# Patient Record
Sex: MALE | Race: WHITE
[De-identification: names, ages, dates, MRNs, and addresses within clinical notes are randomized per-mention and may not be internally consistent; named-entity substitution may affect disease eponyms.]

---

## 2018-06-20 ENCOUNTER — HOSPITAL ENCOUNTER (INPATIENT)
Dept: HOSPITAL 96 - M.ERS | Age: 53
LOS: 6 days | Discharge: HOME | DRG: 291 | End: 2018-06-26
Attending: INTERNAL MEDICINE | Admitting: INTERNAL MEDICINE
Payer: COMMERCIAL

## 2018-06-20 VITALS — SYSTOLIC BLOOD PRESSURE: 216 MMHG | DIASTOLIC BLOOD PRESSURE: 148 MMHG

## 2018-06-20 VITALS — SYSTOLIC BLOOD PRESSURE: 198 MMHG | DIASTOLIC BLOOD PRESSURE: 148 MMHG

## 2018-06-20 VITALS — DIASTOLIC BLOOD PRESSURE: 147 MMHG | SYSTOLIC BLOOD PRESSURE: 210 MMHG

## 2018-06-20 VITALS — SYSTOLIC BLOOD PRESSURE: 156 MMHG | DIASTOLIC BLOOD PRESSURE: 92 MMHG

## 2018-06-20 VITALS — DIASTOLIC BLOOD PRESSURE: 120 MMHG | SYSTOLIC BLOOD PRESSURE: 197 MMHG

## 2018-06-20 VITALS — DIASTOLIC BLOOD PRESSURE: 125 MMHG | SYSTOLIC BLOOD PRESSURE: 189 MMHG

## 2018-06-20 VITALS — SYSTOLIC BLOOD PRESSURE: 170 MMHG | DIASTOLIC BLOOD PRESSURE: 104 MMHG

## 2018-06-20 VITALS — WEIGHT: 272 LBS | BODY MASS INDEX: 33.82 KG/M2 | HEIGHT: 75 IN

## 2018-06-20 VITALS — SYSTOLIC BLOOD PRESSURE: 193 MMHG | DIASTOLIC BLOOD PRESSURE: 127 MMHG

## 2018-06-20 DIAGNOSIS — I16.0: ICD-10-CM

## 2018-06-20 DIAGNOSIS — E78.5: ICD-10-CM

## 2018-06-20 DIAGNOSIS — N25.81: ICD-10-CM

## 2018-06-20 DIAGNOSIS — J96.01: ICD-10-CM

## 2018-06-20 DIAGNOSIS — N18.4: ICD-10-CM

## 2018-06-20 DIAGNOSIS — E66.9: ICD-10-CM

## 2018-06-20 DIAGNOSIS — I50.43: ICD-10-CM

## 2018-06-20 DIAGNOSIS — I13.0: Primary | ICD-10-CM

## 2018-06-20 DIAGNOSIS — J45.909: ICD-10-CM

## 2018-06-20 DIAGNOSIS — N17.9: ICD-10-CM

## 2018-06-20 DIAGNOSIS — J98.4: ICD-10-CM

## 2018-06-20 DIAGNOSIS — J44.9: ICD-10-CM

## 2018-06-20 DIAGNOSIS — Z82.49: ICD-10-CM

## 2018-06-20 DIAGNOSIS — E44.0: ICD-10-CM

## 2018-06-20 DIAGNOSIS — J18.9: ICD-10-CM

## 2018-06-20 DIAGNOSIS — Z79.899: ICD-10-CM

## 2018-06-20 DIAGNOSIS — Z91.14: ICD-10-CM

## 2018-06-20 LAB
ABSOLUTE BASOPHILS: 0.1 THOU/UL (ref 0–0.2)
ABSOLUTE EOSINOPHILS: 0.2 THOU/UL (ref 0–0.7)
ABSOLUTE MONOCYTES: 0.7 THOU/UL (ref 0–1.2)
ALBUMIN SERPL-MCNC: 3.1 G/DL (ref 3.4–5)
ALP SERPL-CCNC: 45 U/L (ref 46–116)
ALT SERPL-CCNC: 25 U/L (ref 30–65)
ANION GAP SERPL CALC-SCNC: 9 MMOL/L (ref 7–16)
APTT BLD: 27.1 SECONDS (ref 25–31.3)
AST SERPL-CCNC: 24 U/L (ref 15–37)
BASOPHILS NFR BLD AUTO: 1 %
BILIRUB SERPL-MCNC: 0.5 MG/DL
BUN SERPL-MCNC: 33 MG/DL (ref 7–18)
CALCIUM SERPL-MCNC: 9 MG/DL (ref 8.5–10.1)
CHLORIDE SERPL-SCNC: 103 MMOL/L (ref 98–107)
CO2 SERPL-SCNC: 28 MMOL/L (ref 21–32)
CREAT SERPL-MCNC: 3.4 MG/DL (ref 0.6–1.3)
EOSINOPHIL NFR BLD: 2.3 %
GLUCOSE SERPL-MCNC: 116 MG/DL (ref 70–99)
GRANULOCYTES NFR BLD MANUAL: 77.4 %
HCT VFR BLD CALC: 35.4 % (ref 42–52)
HGB BLD-MCNC: 12.1 GM/DL (ref 14–18)
INR PPP: 1
LIPASE: 419 U/L (ref 73–393)
LYMPHOCYTES # BLD: 1.2 THOU/UL (ref 0.8–5.3)
LYMPHOCYTES NFR BLD AUTO: 12.1 %
MAGNESIUM SERPL-MCNC: 2.1 MG/DL (ref 1.8–2.4)
MCH RBC QN AUTO: 28 PG (ref 26–34)
MCHC RBC AUTO-ENTMCNC: 34.1 G/DL (ref 28–37)
MCV RBC: 82 FL (ref 80–100)
MONOCYTES NFR BLD: 7.2 %
MPV: 7.9 FL. (ref 7.2–11.1)
NEUTROPHILS # BLD: 7.7 THOU/UL (ref 1.6–8.1)
NUCLEATED RBCS: 0 /100WBC
PLATELET COUNT*: 311 THOU/UL (ref 150–400)
POTASSIUM SERPL-SCNC: 3.5 MMOL/L (ref 3.5–5.1)
PROT SERPL-MCNC: 6.8 G/DL (ref 6.4–8.2)
PROTHROMBIN TIME: 10 SECONDS (ref 9.2–11.5)
RBC # BLD AUTO: 4.32 MIL/UL (ref 4.5–6)
RDW-CV: 12.9 % (ref 10.5–14.5)
SODIUM SERPL-SCNC: 140 MMOL/L (ref 136–145)
TROPONIN-I LEVEL: 0.09 NG/ML (ref ?–0.06)
WBC # BLD AUTO: 9.9 THOU/UL (ref 4–11)

## 2018-06-21 VITALS — SYSTOLIC BLOOD PRESSURE: 179 MMHG | DIASTOLIC BLOOD PRESSURE: 104 MMHG

## 2018-06-21 VITALS — DIASTOLIC BLOOD PRESSURE: 106 MMHG | SYSTOLIC BLOOD PRESSURE: 165 MMHG

## 2018-06-21 VITALS — DIASTOLIC BLOOD PRESSURE: 110 MMHG | SYSTOLIC BLOOD PRESSURE: 174 MMHG

## 2018-06-21 VITALS — DIASTOLIC BLOOD PRESSURE: 100 MMHG | SYSTOLIC BLOOD PRESSURE: 153 MMHG

## 2018-06-21 VITALS — DIASTOLIC BLOOD PRESSURE: 83 MMHG | SYSTOLIC BLOOD PRESSURE: 131 MMHG

## 2018-06-21 VITALS — DIASTOLIC BLOOD PRESSURE: 104 MMHG | SYSTOLIC BLOOD PRESSURE: 155 MMHG

## 2018-06-21 LAB
ANION GAP SERPL CALC-SCNC: 11 MMOL/L (ref 7–16)
BILIRUB UR-MCNC: NEGATIVE MG/DL
BUN SERPL-MCNC: 41 MG/DL (ref 7–18)
CALCIUM SERPL-MCNC: 8.6 MG/DL (ref 8.5–10.1)
CHLORIDE SERPL-SCNC: 102 MMOL/L (ref 98–107)
CO2 SERPL-SCNC: 26 MMOL/L (ref 21–32)
COLOR UR: YELLOW
CREAT SERPL-MCNC: 3.4 MG/DL (ref 0.6–1.3)
GLUCOSE SERPL-MCNC: 133 MG/DL (ref 70–99)
KETONES UR STRIP-MCNC: NEGATIVE MG/DL
MUCUS: (no result) STRN/LPF
POTASSIUM SERPL-SCNC: 3.7 MMOL/L (ref 3.5–5.1)
PROT UR QL STRIP: (no result)
RBC # UR STRIP: NEGATIVE /UL
SODIUM SERPL-SCNC: 139 MMOL/L (ref 136–145)
SP GR UR STRIP: 1.02 (ref 1–1.03)
SQUAMOUS: (no result) /LPF (ref 0–3)
URINE CLARITY: CLEAR
URINE GLUCOSE-RANDOM: NEGATIVE
URINE LEUKOCYTES-REFLEX: NEGATIVE
URINE NITRITE-REFLEX: NEGATIVE
UROBILINOGEN UR STRIP-ACNC: 0.2 E.U./DL (ref 0.2–1)

## 2018-06-22 VITALS — SYSTOLIC BLOOD PRESSURE: 164 MMHG | DIASTOLIC BLOOD PRESSURE: 116 MMHG

## 2018-06-22 VITALS — SYSTOLIC BLOOD PRESSURE: 149 MMHG | DIASTOLIC BLOOD PRESSURE: 100 MMHG

## 2018-06-22 VITALS — SYSTOLIC BLOOD PRESSURE: 169 MMHG | DIASTOLIC BLOOD PRESSURE: 117 MMHG

## 2018-06-22 VITALS — DIASTOLIC BLOOD PRESSURE: 101 MMHG | SYSTOLIC BLOOD PRESSURE: 146 MMHG

## 2018-06-22 VITALS — SYSTOLIC BLOOD PRESSURE: 116 MMHG | DIASTOLIC BLOOD PRESSURE: 77 MMHG

## 2018-06-22 VITALS — DIASTOLIC BLOOD PRESSURE: 107 MMHG | SYSTOLIC BLOOD PRESSURE: 148 MMHG

## 2018-06-22 LAB
ALBUMIN SERPL-MCNC: 2.8 G/DL (ref 3.4–5)
ALP SERPL-CCNC: 41 U/L (ref 46–116)
ALT SERPL-CCNC: 22 U/L (ref 30–65)
ANION GAP SERPL CALC-SCNC: 10 MMOL/L (ref 7–16)
AST SERPL-CCNC: 18 U/L (ref 15–37)
BILIRUB SERPL-MCNC: 0.3 MG/DL
BUN SERPL-MCNC: 47 MG/DL (ref 7–18)
CALCIUM SERPL-MCNC: 8.2 MG/DL (ref 8.5–10.1)
CHLORIDE SERPL-SCNC: 102 MMOL/L (ref 98–107)
CO2 SERPL-SCNC: 25 MMOL/L (ref 21–32)
CREAT SERPL-MCNC: 3.4 MG/DL (ref 0.6–1.3)
EGFR IF AFRICAN AMERICAN: 26 (ref 59–?)
GLUCOSE SERPL-MCNC: 100 MG/DL (ref 70–99)
HCT VFR BLD CALC: 33.1 % (ref 42–52)
HGB BLD-MCNC: 11.1 GM/DL (ref 14–18)
MCH RBC QN AUTO: 28 PG (ref 26–34)
MCHC RBC AUTO-ENTMCNC: 33.7 G/DL (ref 28–37)
MCV RBC: 83 FL (ref 80–100)
MPV: 8.3 FL. (ref 7.2–11.1)
PHOSPHATE SERPL-MCNC: 5.5 MG/DL (ref 2.5–4.9)
PLATELET COUNT*: 293 THOU/UL (ref 150–400)
POTASSIUM SERPL-SCNC: 3.9 MMOL/L (ref 3.5–5.1)
PROT SERPL-MCNC: 6.3 G/DL (ref 6.4–8.2)
PTH-INTACT SERPL-MCNC: 134 PG/ML (ref 15–65)
RBC # BLD AUTO: 3.99 MIL/UL (ref 4.5–6)
RDW-CV: 13.5 % (ref 10.5–14.5)
SODIUM SERPL-SCNC: 137 MMOL/L (ref 136–145)
WBC # BLD AUTO: 11.6 THOU/UL (ref 4–11)

## 2018-06-23 VITALS — DIASTOLIC BLOOD PRESSURE: 110 MMHG | SYSTOLIC BLOOD PRESSURE: 146 MMHG

## 2018-06-23 VITALS — SYSTOLIC BLOOD PRESSURE: 155 MMHG | DIASTOLIC BLOOD PRESSURE: 97 MMHG

## 2018-06-23 VITALS — DIASTOLIC BLOOD PRESSURE: 87 MMHG | SYSTOLIC BLOOD PRESSURE: 132 MMHG

## 2018-06-23 VITALS — SYSTOLIC BLOOD PRESSURE: 171 MMHG | DIASTOLIC BLOOD PRESSURE: 113 MMHG

## 2018-06-23 VITALS — DIASTOLIC BLOOD PRESSURE: 89 MMHG | SYSTOLIC BLOOD PRESSURE: 150 MMHG

## 2018-06-23 LAB
ANION GAP SERPL CALC-SCNC: 7 MMOL/L (ref 7–16)
BUN SERPL-MCNC: 47 MG/DL (ref 7–18)
CALCIUM SERPL-MCNC: 8.5 MG/DL (ref 8.5–10.1)
CHLORIDE SERPL-SCNC: 102 MMOL/L (ref 98–107)
CO2 SERPL-SCNC: 27 MMOL/L (ref 21–32)
CREAT SERPL-MCNC: 3.3 MG/DL (ref 0.6–1.3)
GLUCOSE SERPL-MCNC: 101 MG/DL (ref 70–99)
POTASSIUM SERPL-SCNC: 3.7 MMOL/L (ref 3.5–5.1)
SODIUM SERPL-SCNC: 136 MMOL/L (ref 136–145)

## 2018-06-24 VITALS — DIASTOLIC BLOOD PRESSURE: 91 MMHG | SYSTOLIC BLOOD PRESSURE: 159 MMHG

## 2018-06-24 VITALS — SYSTOLIC BLOOD PRESSURE: 138 MMHG | DIASTOLIC BLOOD PRESSURE: 88 MMHG

## 2018-06-24 VITALS — SYSTOLIC BLOOD PRESSURE: 161 MMHG | DIASTOLIC BLOOD PRESSURE: 107 MMHG

## 2018-06-24 VITALS — SYSTOLIC BLOOD PRESSURE: 139 MMHG | DIASTOLIC BLOOD PRESSURE: 88 MMHG

## 2018-06-24 VITALS — DIASTOLIC BLOOD PRESSURE: 94 MMHG | SYSTOLIC BLOOD PRESSURE: 135 MMHG

## 2018-06-24 VITALS — DIASTOLIC BLOOD PRESSURE: 93 MMHG | SYSTOLIC BLOOD PRESSURE: 143 MMHG

## 2018-06-24 LAB
ANION GAP SERPL CALC-SCNC: 10 MMOL/L (ref 7–16)
BUN SERPL-MCNC: 43 MG/DL (ref 7–18)
CALCIUM SERPL-MCNC: 8.9 MG/DL (ref 8.5–10.1)
CHLORIDE SERPL-SCNC: 102 MMOL/L (ref 98–107)
CO2 SERPL-SCNC: 25 MMOL/L (ref 21–32)
CREAT SERPL-MCNC: 3.3 MG/DL (ref 0.6–1.3)
GLUCOSE SERPL-MCNC: 103 MG/DL (ref 70–99)
POTASSIUM SERPL-SCNC: 3.9 MMOL/L (ref 3.5–5.1)
SODIUM SERPL-SCNC: 137 MMOL/L (ref 136–145)

## 2018-06-25 VITALS — SYSTOLIC BLOOD PRESSURE: 153 MMHG | DIASTOLIC BLOOD PRESSURE: 98 MMHG

## 2018-06-25 VITALS — SYSTOLIC BLOOD PRESSURE: 142 MMHG | DIASTOLIC BLOOD PRESSURE: 94 MMHG

## 2018-06-25 VITALS — SYSTOLIC BLOOD PRESSURE: 151 MMHG | DIASTOLIC BLOOD PRESSURE: 100 MMHG

## 2018-06-25 VITALS — SYSTOLIC BLOOD PRESSURE: 158 MMHG | DIASTOLIC BLOOD PRESSURE: 106 MMHG

## 2018-06-25 LAB
ALBUMIN SERPL-MCNC: 2.9 G/DL (ref 3.4–5)
ALP SERPL-CCNC: 37 U/L (ref 46–116)
ALT SERPL-CCNC: 26 U/L (ref 30–65)
ANION GAP SERPL CALC-SCNC: 13 MMOL/L (ref 7–16)
AST SERPL-CCNC: 15 U/L (ref 15–37)
BILIRUB SERPL-MCNC: 0.4 MG/DL
BUN SERPL-MCNC: 46 MG/DL (ref 7–18)
CALCIUM SERPL-MCNC: 8.8 MG/DL (ref 8.5–10.1)
CHLORIDE SERPL-SCNC: 104 MMOL/L (ref 98–107)
CO2 SERPL-SCNC: 23 MMOL/L (ref 21–32)
CREAT SERPL-MCNC: 3.2 MG/DL (ref 0.6–1.3)
GLUCOSE SERPL-MCNC: 107 MG/DL (ref 70–99)
HCT VFR BLD CALC: 36.1 % (ref 42–52)
HGB BLD-MCNC: 11.9 GM/DL (ref 14–18)
MAGNESIUM SERPL-MCNC: 2.6 MG/DL (ref 1.8–2.4)
MCH RBC QN AUTO: 27.7 PG (ref 26–34)
MCHC RBC AUTO-ENTMCNC: 33.1 G/DL (ref 28–37)
MCV RBC: 83.7 FL (ref 80–100)
MPV: 8.6 FL. (ref 7.2–11.1)
PLATELET COUNT*: 294 THOU/UL (ref 150–400)
POTASSIUM SERPL-SCNC: 3.9 MMOL/L (ref 3.5–5.1)
PROT SERPL-MCNC: 6.3 G/DL (ref 6.4–8.2)
RBC # BLD AUTO: 4.31 MIL/UL (ref 4.5–6)
RDW-CV: 13.4 % (ref 10.5–14.5)
SODIUM SERPL-SCNC: 140 MMOL/L (ref 136–145)
WBC # BLD AUTO: 11.4 THOU/UL (ref 4–11)

## 2018-06-26 VITALS — SYSTOLIC BLOOD PRESSURE: 142 MMHG | DIASTOLIC BLOOD PRESSURE: 97 MMHG

## 2018-06-26 VITALS — DIASTOLIC BLOOD PRESSURE: 102 MMHG | SYSTOLIC BLOOD PRESSURE: 153 MMHG

## 2018-06-26 VITALS — DIASTOLIC BLOOD PRESSURE: 110 MMHG | SYSTOLIC BLOOD PRESSURE: 157 MMHG

## 2018-06-26 LAB
ANION GAP SERPL CALC-SCNC: 13 MMOL/L (ref 7–16)
BUN SERPL-MCNC: 53 MG/DL (ref 7–18)
CALCIUM SERPL-MCNC: 9.2 MG/DL (ref 8.5–10.1)
CHLORIDE SERPL-SCNC: 102 MMOL/L (ref 98–107)
CO2 SERPL-SCNC: 22 MMOL/L (ref 21–32)
CREAT SERPL-MCNC: 3.4 MG/DL (ref 0.6–1.3)
GLUCOSE SERPL-MCNC: 159 MG/DL (ref 70–99)
HCT VFR BLD CALC: 37.5 % (ref 42–52)
HGB BLD-MCNC: 12.5 GM/DL (ref 14–18)
MAGNESIUM SERPL-MCNC: 2.8 MG/DL (ref 1.8–2.4)
MCH RBC QN AUTO: 27.8 PG (ref 26–34)
MCHC RBC AUTO-ENTMCNC: 33.3 G/DL (ref 28–37)
MCV RBC: 83.5 FL (ref 80–100)
MPV: 8.8 FL. (ref 7.2–11.1)
PLATELET COUNT*: 352 THOU/UL (ref 150–400)
POTASSIUM SERPL-SCNC: 5.1 MMOL/L (ref 3.5–5.1)
RBC # BLD AUTO: 4.49 MIL/UL (ref 4.5–6)
RDW-CV: 13.3 % (ref 10.5–14.5)
SODIUM SERPL-SCNC: 137 MMOL/L (ref 136–145)
WBC # BLD AUTO: 15.7 THOU/UL (ref 4–11)

## 2018-07-06 ENCOUNTER — HOSPITAL ENCOUNTER (OUTPATIENT)
Dept: HOSPITAL 96 - M.LAB | Age: 53
End: 2018-07-06
Attending: NURSE PRACTITIONER
Payer: COMMERCIAL

## 2018-07-06 DIAGNOSIS — N18.3: ICD-10-CM

## 2018-07-06 DIAGNOSIS — I12.9: Primary | ICD-10-CM

## 2018-07-06 LAB
ANION GAP SERPL CALC-SCNC: 8 MMOL/L (ref 7–16)
BUN SERPL-MCNC: 52 MG/DL (ref 7–18)
CALCIUM SERPL-MCNC: 9.3 MG/DL (ref 8.5–10.1)
CHLORIDE SERPL-SCNC: 100 MMOL/L (ref 98–107)
CO2 SERPL-SCNC: 27 MMOL/L (ref 21–32)
CREAT SERPL-MCNC: 3 MG/DL (ref 0.6–1.3)
GLUCOSE SERPL-MCNC: 116 MG/DL (ref 70–99)
POTASSIUM SERPL-SCNC: 4.2 MMOL/L (ref 3.5–5.1)
SODIUM SERPL-SCNC: 135 MMOL/L (ref 136–145)

## 2018-10-08 ENCOUNTER — HOSPITAL ENCOUNTER (OUTPATIENT)
Dept: HOSPITAL 96 - M.LAB | Age: 53
End: 2018-10-08
Attending: INTERNAL MEDICINE
Payer: COMMERCIAL

## 2018-10-08 DIAGNOSIS — N18.4: Primary | ICD-10-CM

## 2018-10-08 LAB
ANION GAP SERPL CALC-SCNC: 8 MMOL/L (ref 7–16)
BUN SERPL-MCNC: 30 MG/DL (ref 7–18)
CALCIUM SERPL-MCNC: 9.6 MG/DL (ref 8.5–10.1)
CHLORIDE SERPL-SCNC: 105 MMOL/L (ref 98–107)
CO2 SERPL-SCNC: 27 MMOL/L (ref 21–32)
CREAT SERPL-MCNC: 2.6 MG/DL (ref 0.6–1.3)
GLUCOSE SERPL-MCNC: 70 MG/DL (ref 70–99)
POTASSIUM SERPL-SCNC: 4.2 MMOL/L (ref 3.5–5.1)
SODIUM SERPL-SCNC: 140 MMOL/L (ref 136–145)

## 2018-11-30 ENCOUNTER — HOSPITAL ENCOUNTER (OUTPATIENT)
Dept: HOSPITAL 96 - M.LAB | Age: 53
End: 2018-11-30
Attending: INTERNAL MEDICINE
Payer: COMMERCIAL

## 2018-11-30 DIAGNOSIS — I12.9: Primary | ICD-10-CM

## 2018-11-30 DIAGNOSIS — N18.4: ICD-10-CM

## 2018-11-30 LAB
ANION GAP SERPL CALC-SCNC: 12 MMOL/L (ref 7–16)
BUN SERPL-MCNC: 40 MG/DL (ref 7–18)
CALCIUM SERPL-MCNC: 8.9 MG/DL (ref 8.5–10.1)
CHLORIDE SERPL-SCNC: 103 MMOL/L (ref 98–107)
CO2 SERPL-SCNC: 25 MMOL/L (ref 21–32)
CREAT SERPL-MCNC: 2.9 MG/DL (ref 0.6–1.3)
GLUCOSE SERPL-MCNC: 92 MG/DL (ref 70–99)
POTASSIUM SERPL-SCNC: 3.6 MMOL/L (ref 3.5–5.1)
SODIUM SERPL-SCNC: 140 MMOL/L (ref 136–145)

## 2019-05-08 ENCOUNTER — HOSPITAL ENCOUNTER (OUTPATIENT)
Dept: HOSPITAL 96 - M.CRD | Age: 54
End: 2019-05-08
Attending: INTERNAL MEDICINE
Payer: COMMERCIAL

## 2019-05-08 DIAGNOSIS — I50.9: ICD-10-CM

## 2019-05-08 DIAGNOSIS — N18.4: ICD-10-CM

## 2019-05-08 DIAGNOSIS — I42.8: ICD-10-CM

## 2019-05-08 DIAGNOSIS — I34.0: Primary | ICD-10-CM

## 2019-05-08 DIAGNOSIS — I13.0: ICD-10-CM

## 2020-02-19 ENCOUNTER — HOSPITAL ENCOUNTER (OUTPATIENT)
Dept: HOSPITAL 96 - M.MRI | Age: 55
End: 2020-02-19
Attending: NURSE PRACTITIONER
Payer: COMMERCIAL

## 2020-02-19 DIAGNOSIS — M25.722: ICD-10-CM

## 2020-02-19 DIAGNOSIS — M77.12: Primary | ICD-10-CM

## 2020-02-19 DIAGNOSIS — M51.06: ICD-10-CM

## 2021-08-30 ENCOUNTER — HOSPITAL ENCOUNTER (INPATIENT)
Dept: HOSPITAL 96 - M.ERS | Age: 56
LOS: 7 days | Discharge: HOME | DRG: 177 | End: 2021-09-06
Attending: INTERNAL MEDICINE | Admitting: INTERNAL MEDICINE
Payer: COMMERCIAL

## 2021-08-30 VITALS — DIASTOLIC BLOOD PRESSURE: 101 MMHG | SYSTOLIC BLOOD PRESSURE: 145 MMHG

## 2021-08-30 VITALS — DIASTOLIC BLOOD PRESSURE: 78 MMHG | SYSTOLIC BLOOD PRESSURE: 118 MMHG

## 2021-08-30 VITALS — WEIGHT: 248 LBS | HEIGHT: 75 IN | BODY MASS INDEX: 30.84 KG/M2

## 2021-08-30 DIAGNOSIS — I13.0: ICD-10-CM

## 2021-08-30 DIAGNOSIS — N40.0: ICD-10-CM

## 2021-08-30 DIAGNOSIS — E83.41: ICD-10-CM

## 2021-08-30 DIAGNOSIS — U07.1: Primary | ICD-10-CM

## 2021-08-30 DIAGNOSIS — Z79.899: ICD-10-CM

## 2021-08-30 DIAGNOSIS — Z82.49: ICD-10-CM

## 2021-08-30 DIAGNOSIS — D69.6: ICD-10-CM

## 2021-08-30 DIAGNOSIS — J96.01: ICD-10-CM

## 2021-08-30 DIAGNOSIS — I42.8: ICD-10-CM

## 2021-08-30 DIAGNOSIS — N18.4: ICD-10-CM

## 2021-08-30 DIAGNOSIS — I48.0: ICD-10-CM

## 2021-08-30 DIAGNOSIS — E66.9: ICD-10-CM

## 2021-08-30 DIAGNOSIS — J12.82: ICD-10-CM

## 2021-08-30 DIAGNOSIS — E87.6: ICD-10-CM

## 2021-08-30 DIAGNOSIS — I50.9: ICD-10-CM

## 2021-08-30 DIAGNOSIS — E87.2: ICD-10-CM

## 2021-08-30 DIAGNOSIS — E87.1: ICD-10-CM

## 2021-08-30 DIAGNOSIS — N17.0: ICD-10-CM

## 2021-08-30 LAB
ABSOLUTE MONOCYTES: 0.1 THOU/UL (ref 0–1.2)
ALBUMIN SERPL-MCNC: 2.5 G/DL (ref 3.4–5)
ALP SERPL-CCNC: 53 U/L (ref 46–116)
ALT SERPL-CCNC: 74 U/L (ref 30–65)
ANION GAP SERPL CALC-SCNC: 21 MMOL/L (ref 7–16)
AST SERPL-CCNC: 77 U/L (ref 15–37)
BILIRUB SERPL-MCNC: 0.5 MG/DL
BUN SERPL-MCNC: 132 MG/DL (ref 7–18)
CALCIUM SERPL-MCNC: 8.4 MG/DL (ref 8.5–10.1)
CHLORIDE SERPL-SCNC: 94 MMOL/L (ref 98–107)
CO2 SERPL-SCNC: 16 MMOL/L (ref 21–32)
CREAT SERPL-MCNC: 6.5 MG/DL (ref 0.6–1.3)
GIANT PLATELETS BLD QL SMEAR: (no result)
GLUCOSE SERPL-MCNC: 182 MG/DL (ref 70–99)
GRANULOCYTES NFR BLD MANUAL: 96 %
HCT VFR BLD CALC: 43.9 % (ref 42–52)
HGB BLD-MCNC: 15 GM/DL (ref 14–18)
LG PLATELETS BLD QL SMEAR: (no result)
LYMPHOCYTES # BLD: 0.3 THOU/UL (ref 0.8–5.3)
LYMPHOCYTES NFR BLD AUTO: 3 %
MAGNESIUM SERPL-MCNC: 2.8 MG/DL (ref 1.8–2.4)
MCH RBC QN AUTO: 27.1 PG (ref 26–34)
MCHC RBC AUTO-ENTMCNC: 34.1 G/DL (ref 28–37)
MCV RBC: 79.5 FL (ref 80–100)
MONOCYTES NFR BLD: 1 %
MPV: 11.1 FL. (ref 7.2–11.1)
NEUTROPHILS # BLD: 10.1 THOU/UL (ref 1.6–8.1)
NT-PRO BRAIN NAT PEPTIDE: 8351 PG/ML (ref ?–300)
NUCLEATED RBCS: 0 /100WBC
PHOSPHATE SERPL-MCNC: 5.1 MG/DL (ref 2.5–4.9)
PLATELET # BLD EST: (no result) 10*3/UL
PLATELET COUNT*: 51 THOU/UL (ref 150–400)
POTASSIUM SERPL-SCNC: 2.4 MMOL/L (ref 3.5–5.1)
PROT SERPL-MCNC: 7 G/DL (ref 6.4–8.2)
RBC # BLD AUTO: 5.52 MIL/UL (ref 4.5–6)
RBC MORPH BLD: NORMAL
RDW-CV: 13.8 % (ref 10.5–14.5)
SODIUM SERPL-SCNC: 131 MMOL/L (ref 136–145)
WBC # BLD AUTO: 10.5 THOU/UL (ref 4–11)

## 2021-08-30 NOTE — EMS
[place] [address]                    EMS Patient Care Report       
_______________________________________________________________________________
 
Name:       MARIELA LACEY                 Room:           [address] [place]  
[facility]#:  [id number]      Account #:      [id number]  
Admission:  21     Attend Phys:    Joyce Nelson MD 
Discharge:               Date of Birth:  [birth year]  
         Report #: [id number]
                                                                     [id number]
_______________________________________________________________________________
THIS REPORT FOR:  //name//                      
 
Report Transmitted: 2021 11:28
EMS Care Summary
[facility]
Incident [id number] @ 2021 15:58
 
Incident Location
[address] [place] [address]
 
Patient
MARIELA LACEY
Male, 56 Years
 1965
 
Patient Address
[address] [place] [address]
 
Patient History
Asthma,Cardiac Arrythmia,Hypertension (HTN),Myocardial Infarction (MI),
 
Patient Allergies
No known allergies,
 
Patient Medications
Lisinopril, Albuterol,
 
Chief Complaint
I tested positive a week ago
 
Disposition
Transported No Lights/[facility]
 
Dispatch Reason
Breathing Problem
 
Transported To
[facility]
 
Narrative
Med 2 response requested for shortness of air at [address] [place] [address]. Med 2 copied 
the call and began our response to the scene. We arrived on scene without 
incident. 
 
 
 
 
[place] [address]                    EMS Patient Care Report       
_______________________________________________________________________________
 
Name:       MARIELA LACEY                 Room:           [address] [place]  
[facility]#:  [id number]      Account #:      [id number]  
Admission:  21     Attend Phys:    Joyce Nelson MD 
Discharge:               Date of Birth:  [birth year]  
         Report #: [id number]
                                                                     [id number]
_______________________________________________________________________________
Standing at the front door to the home we located a male. He's alert with a GCS 
of 15. He felt short of air with inspiration and expiration. His family on 
scene advised he tested positive for COVID a week prior. Patient was very 
anxious and began to hyperventilate. His family advised that he has sever 
anxiety. He was asked to have a seat on the cot so we could get him loaded and 
assess him further. Patient was so anxious he got up from the cot and tried to 
run back into his residence. We were able to get him to come sit on the cot 
again so we could continue our assessment. He was placed on oxygen via NC at 2 
lpm more for comfort purposes. Lungs were clear with mild labored breathing. He 
was secured and taken to the ambulance and placed inside. 
 
Vitals were monitored. 12 lead ECG obtained. IV access obtained in the left AC 
with fluid attached. He was given solumedrol IV. Patient began to get anxious 
again as he felt the back of the ambulance was not cool enough. We explained 
that it takes a bit to cool off based on the 90 + degree weather outside. He 
was given a cold pack for the back of his neck. He requested to go to [facility] [place] 
for further evaluation. We began our transport to the ER. He was monitored 
during transport. He stated that the oxygen was not helping him as much as he'd 
like. He requested to be given medication to fix his COVID symptoms. We tried 
to calm and reassure him that fluid would help. Patient then took a phone call 
on his cellphone. He was able to speak in full sentences without any distress 
noted. Once he was off the phone he began to complain of increased shortness of 
air. He was then coached to slow his breathing and take deeper breaths. 
 
Report was called in to the ER 15 min prior via med radio. We were advised that 
they were on high volume however patient still wanted to continue. Upon arrival 
to the ER patient was taken inside and to ER 9 where he was moved. Report was 
given to the ER and signatures were obtained. Med 2 transferred care and 
returned to service. 
 
Initial Vitals
@16:13P: 130,R: 22,BP: 110/50,Pain: 0/10,GCS: 15,Glucose: 100,SpO2: 96,Revised 
Trauma: 12, 
@16:38P: 137,R: 22,BP: 106/70,GCS: 15,SpO2: 99,Revised Trauma: 12,
@16:25P: 145,R: 22,BP: 108/70,GCS: 15,SpO2: 96,Revised Trauma: 12,
@16:13P: 90,R: 20,BP: 109/51,GCS: 15,SpO2: 97,Revised Trauma: 12,
 
Assessments
@16:19MENTAL:Place Oriented,Person Oriented,Time Oriented,Event 
Oriented,SKIN:HEENT:LUNG 
SOUNDS:ABDOMEN:PELVIS//GI:EXTREMITIES:PULSE:NEURO:@16:50MENTAL:Person 
Oriented,Event Oriented,Time Oriented,Place Oriented,SKIN:HEENT:LUNG 
SOUNDS:ABDOMEN:PELVIS//GI:EXTREMITIES:PULSE:NEURO: 
 
Impression
COVID-19 - Confirmed by testing
 
 
 
[place] [address]                    EMS Patient Care Report       
_______________________________________________________________________________
 
Name:       MARIELA LACEY                 Room:           170-9    [facility] [place]  
[facility]#:  [id number]      Account #:      [id number]  
Admission:  21     Attend Phys:    Joyce Nelson MD 
Discharge:               Date of Birth:  [birth year]  
         Report #: [id number]
                                                                     [id number]
_______________________________________________________________________________
 
Procedures
@16:13ALS AssessmentResponse: UnchangedSucceeded@16:31Lactated Ringers 200cc 
(20 ga) Site: Antecubital-LeftResponse: UnchangedSucceeded@16:32Solu-Medrol - 
125 Milligrams (mg) - Intravenous (IV)Response: Improved@16:2512-Lead 
ECGResponse: UnchangedSucceeded@16:19Oxygen FlowRate: 3 Device: Nasal Cannula 
(NC) Response: ImprovedSucceeded 
 
Timeline
15:58,Call Received
15:58,Dispatched
15:59,En Route
16:12,On Scene
16:13,At Patient
16:13,BP: 110/50 M,PULSE: 130,RR: 22 R,SPO2: 96 Ox,ETCO2:  ,B,PAIN: 
0,GCS: 15, 
16:13,BP: 109/51 M,PULSE: 90,RR: 20 R,SPO2: 97 Ox,ETCO2:  ,BG: ,PAIN: ,GCS: 15,
16:13,ALS Assessment,Response: UnchangedSucceeded,
16:19,Oxygen FlowRate: 3 Device: Nasal Cannula (NC) Response: ImprovedSucceeded,
16:25,12-Lead ECG,Response: UnchangedSucceeded,
16:25,BP: 108/70 M,PULSE: 145,RR: 22 R,SPO2: 96 Ox,ETCO2:  ,BG: ,PAIN: ,GCS: 15,
16:31,Lactated Ringers 200cc 20 ga Site: Antecubital-Left,Response: 
UnchangedSucceeded, 
16:32,Solu-Medrol - 125 Milligrams (mg) - Intravenous (IV),Response: Improved
16:38,BP: 106/70 M,PULSE: 137,RR: 22 R,SPO2: 99 Ox,ETCO2:  ,BG: ,PAIN: ,GCS: 15,
16:45,Depart Scene
16:54,At Destination
17:22,Call Closed
 
Disclaimer
v1.1     Copyright  [employer]
This EMS Care Summary contains data elements from the applicable legal record 
(which may be displayed differently). It is designed to provide pertinent 
information for the following purposes: continuity of care, clinical quality, 
and state data reporting. The complete legal record is available to ED staff 
and administrators of the receiving hospital in "[employer]'s Patient Tracker. All data 
is provided "as is."

## 2021-08-31 VITALS — SYSTOLIC BLOOD PRESSURE: 151 MMHG | DIASTOLIC BLOOD PRESSURE: 89 MMHG

## 2021-08-31 VITALS — DIASTOLIC BLOOD PRESSURE: 83 MMHG | SYSTOLIC BLOOD PRESSURE: 128 MMHG

## 2021-08-31 VITALS — SYSTOLIC BLOOD PRESSURE: 124 MMHG | DIASTOLIC BLOOD PRESSURE: 84 MMHG

## 2021-08-31 VITALS — SYSTOLIC BLOOD PRESSURE: 128 MMHG | DIASTOLIC BLOOD PRESSURE: 83 MMHG

## 2021-08-31 VITALS — SYSTOLIC BLOOD PRESSURE: 118 MMHG | DIASTOLIC BLOOD PRESSURE: 79 MMHG

## 2021-08-31 VITALS — SYSTOLIC BLOOD PRESSURE: 136 MMHG | DIASTOLIC BLOOD PRESSURE: 100 MMHG

## 2021-08-31 LAB
ABSOLUTE BASOPHILS: 0 THOU/UL (ref 0–0.2)
ABSOLUTE EOSINOPHILS: 0 THOU/UL (ref 0–0.7)
ABSOLUTE MONOCYTES: 0.2 THOU/UL (ref 0–1.2)
ANION GAP SERPL CALC-SCNC: 17 MMOL/L (ref 7–16)
BACTERIA #/AREA URNS HPF: (no result) /HPF
BASOPHILS NFR BLD AUTO: 0.1 %
BE: -6.5 MMOL/L
BILIRUB UR-MCNC: NEGATIVE MG/DL
BUN SERPL-MCNC: 130 MG/DL (ref 7–18)
CALCIUM SERPL-MCNC: 8.1 MG/DL (ref 8.5–10.1)
CHLORIDE SERPL-SCNC: 100 MMOL/L (ref 98–107)
CO2 SERPL-SCNC: 15 MMOL/L (ref 21–32)
COLOR UR: YELLOW
CREAT SERPL-MCNC: 6.2 MG/DL (ref 0.6–1.3)
EOSINOPHIL NFR BLD: 0.1 %
GLUCOSE SERPL-MCNC: 206 MG/DL (ref 70–99)
GRANULOCYTES NFR BLD MANUAL: 94.7 %
HCT VFR BLD CALC: 38.8 % (ref 42–52)
HGB BLD-MCNC: 13.1 GM/DL (ref 14–18)
KETONES UR STRIP-MCNC: NEGATIVE MG/DL
LYMPHOCYTES # BLD: 0.3 THOU/UL (ref 0.8–5.3)
LYMPHOCYTES NFR BLD AUTO: 3.3 %
MCH RBC QN AUTO: 27.1 PG (ref 26–34)
MCHC RBC AUTO-ENTMCNC: 33.6 G/DL (ref 28–37)
MCV RBC: 80.5 FL (ref 80–100)
MONOCYTES NFR BLD: 1.8 %
MPV: 10.1 FL. (ref 7.2–11.1)
NEUTROPHILS # BLD: 9.4 THOU/UL (ref 1.6–8.1)
NITRITE UR QL STRIP: NEGATIVE
NUCLEATED RBCS: 0 /100WBC
PCO2 BLD: 24.9 MMHG (ref 35–45)
PLATELET COUNT*: 54 THOU/UL (ref 150–400)
PO2 BLD: 174.4 MMHG (ref 75–100)
POTASSIUM SERPL-SCNC: 3.6 MMOL/L (ref 3.5–5.1)
PROT UR QL STRIP: (no result)
RBC # BLD AUTO: 4.82 MIL/UL (ref 4.5–6)
RBC # UR STRIP: (no result) /UL
RBC #/AREA URNS HPF: (no result) /HPF (ref 0–2)
RDW-CV: 14 % (ref 10.5–14.5)
SODIUM SERPL-SCNC: 132 MMOL/L (ref 136–145)
SP GR UR STRIP: 1.01 (ref 1–1.03)
SQUAMOUS: (no result) /LPF (ref 0–3)
URINE CLARITY: CLEAR
URINE GLUCOSE-RANDOM: NEGATIVE
URINE LEUKOCYTES: NEGATIVE
UROBILINOGEN UR STRIP-ACNC: 0.2 E.U./DL (ref 0.2–1)
WBC # BLD AUTO: 9.9 THOU/UL (ref 4–11)
WBC #/AREA URNS HPF: (no result) /HPF (ref 0–5)

## 2021-08-31 NOTE — EKG
[place] [address]
Phone:  [phone]                     ELECTROCARDIOGRAM REPORT      
_______________________________________________________________________________
 
Name:         MARIELA LACEY                Room:          [place] [address]    ADM [place] 
[facility]#:    [id number]     Account #:     [id number]  
Admission:    21    Attend Phys:   Joyce Nelson, 
Discharge:                Date of Birth: [birth year]  
Date of Service: 21 1745  Report #:      [id number]
        [id number]
_______________________________________________________________________________
THIS REPORT FOR:  //name//                      
 
                         [facility] ED
                                       
Test Date:    2021               Test Time:    17:45:05
Pat Name:     MARIELA LACEY             Department:   
Patient ID:   [id number]            Room:         [facility]
Gender:       M                        Technician:   LENIN
:          1965               Requested By: Aurelia Fulton
Order Number: [id number] MD:   Alex Lazaro
                                 Measurements
Intervals                              Axis          
Rate:         135                      P:            
ME:                                    QRS:          -10
QRSD:         107                      T:            181
QT:           332                                    
QTc:          498                                    
                           Interpretive Statements
Atrial fibrillation
Repol abnrm suggests ischemia, diffuse leads
No previous ECG available for comparison
Electronically Signed On 2021 14:22:52 CDT by Alex Lazaro
[email or web address]
 
 
 
 
 
 
 
 
 
 
 
 
 
 
 
 
 
 
 
 
 
  <ELECTRONICALLY SIGNED>
                                           By: Alex Lazaro MD, [facility]      
  21     1422
D: 21 1745   _____________________________________
T: 21 1745   Alex Lazaro MD, [facility]        /EPI

## 2021-08-31 NOTE — 2DMMODE
[place] [address]
Phone:  [phone] 2 D/M-MODE ECHOCARDIOGRAM     
_______________________________________________________________________________
 
Name:         MARIELA LACEY                Room:          [address] [place] 
[facility]#:    [id number]     Account #:     [id number]  
Admission:    21    Attend Phys:   Joyce Nelson, 
Discharge:                Date of Birth: [birth year]  
Date of Service: 21 1611  Report #:      [id number]
        [id number]
_______________________________________________________________________________
THIS REPORT FOR:
 
cc:  Elaine Dong Angela Jo RNP Liston, Michael J. MD [facility]     
                                                                       ~
 
--------------- APPROVED REPORT --------------
 
 
Study performed:  2021 10:51:12
 
EXAM: Comprehensive 2D, Doppler, and color-flow 
Echocardiogram 
Patient Location: In-Patient   
Room #:  ER     Status:  routine
 
      BSA:         2.15
HR: 90 bpm BP:          127/80 mmHg 
Rhythm: NSR     
 
Other Information 
Study Quality: Good
 
Indications
Dyspnea 
 
2D Dimensions
IVSd:  10.41 (7-11mm) LVOT Diam:  20.11 (18-24mm) 
LVDd:  44.71 mm  
PWd:  10.45 (7-11mm) Ascending Ao:  34.06 (22-36mm)
LVDs:  24.80 (25-40mm) 
Aortic Root:  32.70 mm 
 
Volumes
Left Atrial Volume (Systole) 
    LA ESV Index:  17.90 mL/m2
 
Aortic Valve
AoV Peak Jay.:  1.23 m/s 
AO Peak Gr.:  6.04 mmHg  LVOT Max P.39 mmHg
AO Mean Gr.:  3.12 mmHg  LVOT Mean P.21 mmHg
    LVOT Max V:  1.05 m/s
AO V2 VTI:  18.43 cm  LVOT Mean V:  0.68 m/s
RANI (VTI):  3.26 cm2  LVOT V1 VTI:  18.92 cm
 
 
 
[place] [address]
Phone:  [phone]                     2 D/M-MODE ECHOCARDIOGRAM     
_______________________________________________________________________________
 
Name:         MARIELA LACEY                Room:          [address] [place] 
[facility]#:    [id number]     Account #:     [id number]  
Admission:    21    Attend Phys:   Joyce Nelson, 
Discharge:                Date of Birth: [birth year]  
Date of Service: 21 1611  Report #:      [id number]
        [id number]
_______________________________________________________________________________
TDI
 Lateral E' Jay.:  0.10 m/s
 
Pulmonary Valve
PV Peak Jay.:  0.97 m/s PV Peak Gr.:  3.78 mmHg
 
Left Ventricle
The left ventricle is normal size. There is normal LV segmental wall 
motion. There is normal left ventricular wall thickness. Left 
ventricular systolic function is normal. LVEF is 65-70%. This study 
is not technically sufficient to allow evaluation of the LV diastolic 
function due to atrial fibrillation.
 
Right Ventricle
The right ventricle is normal size. The right ventricular systolic 
function is normal.
 
Atria
The left atrium size is normal. The right atrium size is 
normal.
 
Aortic Valve
The aortic valve is normal in structure. No aortic regurgitation is 
present. There is no aortic valvular stenosis.
 
Mitral Valve
The mitral valve is normal in structure. Trace mitral regurgitation. 
No evidence of mitral valve stenosis.
 
Tricuspid Valve
The tricuspid valve is normal in structure. Trace tricuspid 
regurgitation. Unable to assess PA pressure.
 
Pulmonic Valve
The pulmonary valve is normal in structure. There is no pulmonic 
valvular regurgitation.
 
Great Vessels
The aortic root is normal in size. IVC is not well 
visualized.
 
Pericardium
There is no pericardial effusion.
 
<Conclusion>
The left ventricle is normal size.
 
 
[place] [address]
Phone:  [phone]                     2 D/M-MODE ECHOCARDIOGRAM     
_______________________________________________________________________________
 
Name:         DEEPTHIMARIELA                Room:          [address] [place] 
[facility].#:    [id number]     Account #:     [id number]  
Admission:    21    Attend Phys:   Joyce Nelson, 
Discharge:                Date of Birth: [birth year]  
Date of Service: 21 1611  Report #:      [id number]
        [id number]
_______________________________________________________________________________
There is normal left ventricular wall thickness.
Left ventricular systolic function is normal.
LVEF is 65-70%.
This study is not technically sufficient to allow evaluation of the 
LV diastolic function due to atrial fibrillation.
There is normal LV segmental wall motion.
Trace mitral regurgitation.
Trace tricuspid regurgitation.
 
 
 
 
 
 
 
 
 
 
 
 
 
 
 
 
 
 
 
 
 
 
 
 
 
 
 
 
 
 
 
 
 
 
 
 
  <ELECTRONICALLY SIGNED>
                                           By: Davin Garces MD, FACC   
  21
D: 21   _____________________________________
T: 21   Davin Garces MD, FAC     /INF

## 2021-09-01 VITALS — DIASTOLIC BLOOD PRESSURE: 95 MMHG | SYSTOLIC BLOOD PRESSURE: 137 MMHG

## 2021-09-01 VITALS — SYSTOLIC BLOOD PRESSURE: 129 MMHG | DIASTOLIC BLOOD PRESSURE: 75 MMHG

## 2021-09-01 VITALS — DIASTOLIC BLOOD PRESSURE: 84 MMHG | SYSTOLIC BLOOD PRESSURE: 127 MMHG

## 2021-09-01 VITALS — DIASTOLIC BLOOD PRESSURE: 88 MMHG | SYSTOLIC BLOOD PRESSURE: 127 MMHG

## 2021-09-01 VITALS — DIASTOLIC BLOOD PRESSURE: 80 MMHG | SYSTOLIC BLOOD PRESSURE: 112 MMHG

## 2021-09-01 VITALS — SYSTOLIC BLOOD PRESSURE: 134 MMHG | DIASTOLIC BLOOD PRESSURE: 77 MMHG

## 2021-09-01 VITALS — DIASTOLIC BLOOD PRESSURE: 82 MMHG | SYSTOLIC BLOOD PRESSURE: 124 MMHG

## 2021-09-01 VITALS — SYSTOLIC BLOOD PRESSURE: 112 MMHG | DIASTOLIC BLOOD PRESSURE: 80 MMHG

## 2021-09-01 LAB
ABSOLUTE MONOCYTES: 0.8 THOU/UL (ref 0–1.2)
ALBUMIN SERPL-MCNC: 2.2 G/DL (ref 3.4–5)
ALP SERPL-CCNC: 46 U/L (ref 46–116)
ALT SERPL-CCNC: 57 U/L (ref 30–65)
ANION GAP SERPL CALC-SCNC: 16 MMOL/L (ref 7–16)
AST SERPL-CCNC: 36 U/L (ref 15–37)
BILIRUB SERPL-MCNC: 0.2 MG/DL
BUN SERPL-MCNC: 133 MG/DL (ref 7–18)
CALCIUM SERPL-MCNC: 8.1 MG/DL (ref 8.5–10.1)
CHLORIDE SERPL-SCNC: 104 MMOL/L (ref 98–107)
CO2 SERPL-SCNC: 18 MMOL/L (ref 21–32)
CREAT SERPL-MCNC: 6.1 MG/DL (ref 0.6–1.3)
EST. AVERAGE GLUCOSE BLD GHB EST-MCNC: 146 MG/DL
GLUCOSE SERPL-MCNC: 143 MG/DL (ref 70–99)
GLYCOHEMOGLOBIN (HGB A1C): 6.7 % (ref 4.8–5.6)
GRANULOCYTES NFR BLD MANUAL: 94 %
HCT VFR BLD CALC: 36.7 % (ref 42–52)
HGB BLD-MCNC: 12.2 GM/DL (ref 14–18)
LG PLATELETS BLD QL SMEAR: (no result)
LYMPHOCYTES # BLD: 0.2 THOU/UL (ref 0.8–5.3)
LYMPHOCYTES NFR BLD AUTO: 1 %
MCH RBC QN AUTO: 27 PG (ref 26–34)
MCHC RBC AUTO-ENTMCNC: 33.2 G/DL (ref 28–37)
MCV RBC: 81.4 FL (ref 80–100)
MONOCYTES NFR BLD: 4 %
MPV: 10.5 FL. (ref 7.2–11.1)
NEUTROPHILS # BLD: 19 THOU/UL (ref 1.6–8.1)
NEUTS BAND NFR BLD: 1 %
NUCLEATED RBCS: 0 /100WBC
PLATELET # BLD EST: (no result) 10*3/UL
PLATELET COUNT*: 62 THOU/UL (ref 150–400)
POTASSIUM SERPL-SCNC: 3.2 MMOL/L (ref 3.5–5.1)
PROT SERPL-MCNC: 5.9 G/DL (ref 6.4–8.2)
RBC # BLD AUTO: 4.5 MIL/UL (ref 4.5–6)
RBC MORPH BLD: NORMAL
RDW-CV: 14 % (ref 10.5–14.5)
SODIUM SERPL-SCNC: 138 MMOL/L (ref 136–145)
WBC # BLD AUTO: 20 THOU/UL (ref 4–11)

## 2021-09-01 NOTE — NUR
CM ASSESSMENT:
PT IS A&P, INDEPENDENT WITH ADL'S, ACTIVE AND WORKS OUTSIDE THE HOME. PT
RESIDES AT HOME WITH HIS DTR. PT USES 0 DME. PT HAS 0 HX OF HH OR SNF. CM D/C
PLANNING NEEDS TBD AT THIS TIME. CM WILL REMAIN AVAILABLE TO ASSIST AND FOLLOW
AS NEEDED.

## 2021-09-01 NOTE — NUR
RECEIVED PT FROM ED AT APPROX 0230. PT IS AWAKE AND ORIENTED X4. PT IS NOT IN
DISTRESS, NO DESATURATIONS ON 2L/NC. PT DENIES PAIN/DISCOMFORT. ASSESSMENT
DONE AS CHARTED. NO ACUTE CHANGES THIS SHIFT. CALL LIGHT WITHIN REACH. HOURLY
ROUNDING DONE FOR PT SAFETY.

## 2021-09-02 VITALS — SYSTOLIC BLOOD PRESSURE: 135 MMHG | DIASTOLIC BLOOD PRESSURE: 107 MMHG

## 2021-09-02 VITALS — DIASTOLIC BLOOD PRESSURE: 65 MMHG | SYSTOLIC BLOOD PRESSURE: 145 MMHG

## 2021-09-02 VITALS — DIASTOLIC BLOOD PRESSURE: 86 MMHG | SYSTOLIC BLOOD PRESSURE: 132 MMHG

## 2021-09-02 VITALS — SYSTOLIC BLOOD PRESSURE: 142 MMHG | DIASTOLIC BLOOD PRESSURE: 95 MMHG

## 2021-09-02 VITALS — DIASTOLIC BLOOD PRESSURE: 87 MMHG | SYSTOLIC BLOOD PRESSURE: 139 MMHG

## 2021-09-02 VITALS — DIASTOLIC BLOOD PRESSURE: 83 MMHG | SYSTOLIC BLOOD PRESSURE: 135 MMHG

## 2021-09-02 LAB
ABSOLUTE BASOPHILS: 0 THOU/UL (ref 0–0.2)
ABSOLUTE EOSINOPHILS: 0.1 THOU/UL (ref 0–0.7)
ABSOLUTE MONOCYTES: 1 THOU/UL (ref 0–1.2)
ANION GAP SERPL CALC-SCNC: 13 MMOL/L (ref 7–16)
BASOPHILS NFR BLD AUTO: 0.2 %
BUN SERPL-MCNC: 123 MG/DL (ref 7–18)
CALCIUM SERPL-MCNC: 8.2 MG/DL (ref 8.5–10.1)
CHLORIDE SERPL-SCNC: 105 MMOL/L (ref 98–107)
CO2 SERPL-SCNC: 22 MMOL/L (ref 21–32)
CREAT SERPL-MCNC: 5.9 MG/DL (ref 0.6–1.3)
EOSINOPHIL NFR BLD: 1 %
GLUCOSE SERPL-MCNC: 96 MG/DL (ref 70–99)
GRANULOCYTES NFR BLD MANUAL: 86.3 %
HCT VFR BLD CALC: 36.4 % (ref 42–52)
HGB BLD-MCNC: 12.2 GM/DL (ref 14–18)
LYMPHOCYTES # BLD: 0.6 THOU/UL (ref 0.8–5.3)
LYMPHOCYTES NFR BLD AUTO: 4.9 %
MCH RBC QN AUTO: 27.2 PG (ref 26–34)
MCHC RBC AUTO-ENTMCNC: 33.4 G/DL (ref 28–37)
MCV RBC: 81.6 FL (ref 80–100)
MONOCYTES NFR BLD: 7.6 %
MPV: 9.4 FL. (ref 7.2–11.1)
NEUTROPHILS # BLD: 11.2 THOU/UL (ref 1.6–8.1)
NUCLEATED RBCS: 0 /100WBC
PLATELET COUNT*: 50 THOU/UL (ref 150–400)
POTASSIUM SERPL-SCNC: 3.5 MMOL/L (ref 3.5–5.1)
RBC # BLD AUTO: 4.46 MIL/UL (ref 4.5–6)
RDW-CV: 14.3 % (ref 10.5–14.5)
SODIUM SERPL-SCNC: 140 MMOL/L (ref 136–145)
WBC # BLD AUTO: 13 THOU/UL (ref 4–11)

## 2021-09-02 NOTE — NUR
ASSUMED PT CARE AT APPROX 1930. PT IS AWAKE AND ORIENTED X4. PT IS TRACING
AFIB ON THE CARDIAC MONITOR. PT DENIES CHEST PAIN/DISCOMFORT, HAS SOME
HEADACHE RELIEVED BY PAIN MEDS GIVEN PER MAR. PT HAD 6 BEATS OF VTACH AT
APPROX 0345, PT IS TACHYCARDIC WITH ACTIVITY (110-130s) BUT IS NOT SUSTAINED
AND RATE WILL GO DOWN AT REST, PT DENIES CHEST PAIN/DISCOMFORT. PT IS CLOSELY
MONITORED.

## 2021-09-02 NOTE — NUR
patient had an uneventful day. rested in bed most of the day. will start new
iv site. still in atrial fibrillation.

## 2021-09-02 NOTE — NUR
ASSUME CARE OF PATIENT AT 0700. PATIENT IS AWAKE IN ROOM. COVID PCR IS
POSITIVE. PATIENT DOES NOT NEED ISOLATION AT THIS TIME. ATRIAL FIB ON CARDIAC
MONITOR. IV SITE LAC PATENT.

## 2021-09-03 VITALS — SYSTOLIC BLOOD PRESSURE: 143 MMHG | DIASTOLIC BLOOD PRESSURE: 91 MMHG

## 2021-09-03 VITALS — SYSTOLIC BLOOD PRESSURE: 105 MMHG | DIASTOLIC BLOOD PRESSURE: 62 MMHG

## 2021-09-03 VITALS — SYSTOLIC BLOOD PRESSURE: 144 MMHG | DIASTOLIC BLOOD PRESSURE: 83 MMHG

## 2021-09-03 VITALS — SYSTOLIC BLOOD PRESSURE: 131 MMHG | DIASTOLIC BLOOD PRESSURE: 88 MMHG

## 2021-09-03 VITALS — DIASTOLIC BLOOD PRESSURE: 93 MMHG | SYSTOLIC BLOOD PRESSURE: 140 MMHG

## 2021-09-03 VITALS — SYSTOLIC BLOOD PRESSURE: 135 MMHG | DIASTOLIC BLOOD PRESSURE: 97 MMHG

## 2021-09-03 LAB
ABSOLUTE BASOPHILS: 0.1 THOU/UL (ref 0–0.2)
ABSOLUTE EOSINOPHILS: 0.2 THOU/UL (ref 0–0.7)
ABSOLUTE MONOCYTES: 1.2 THOU/UL (ref 0–1.2)
ANION GAP SERPL CALC-SCNC: 12 MMOL/L (ref 7–16)
BASOPHILS NFR BLD AUTO: 0.9 %
BUN SERPL-MCNC: 105 MG/DL (ref 7–18)
CALCIUM SERPL-MCNC: 8.6 MG/DL (ref 8.5–10.1)
CHLORIDE SERPL-SCNC: 105 MMOL/L (ref 98–107)
CO2 SERPL-SCNC: 22 MMOL/L (ref 21–32)
CREAT SERPL-MCNC: 5.3 MG/DL (ref 0.6–1.3)
EOSINOPHIL NFR BLD: 1.2 %
GLUCOSE SERPL-MCNC: 96 MG/DL (ref 70–99)
GRANULOCYTES NFR BLD MANUAL: 84.6 %
HCT VFR BLD CALC: 37.6 % (ref 42–52)
HGB BLD-MCNC: 12.3 GM/DL (ref 14–18)
KAPPA LC SERPL-MCNC: 79 MG/L (ref 3.3–19.4)
LAMBDA LC FREE SERPL-MCNC: 67.5 MG/L (ref 5.7–26.3)
LYMPHOCYTES # BLD: 0.9 THOU/UL (ref 0.8–5.3)
LYMPHOCYTES NFR BLD AUTO: 5.7 %
MCH RBC QN AUTO: 26.9 PG (ref 26–34)
MCHC RBC AUTO-ENTMCNC: 32.8 G/DL (ref 28–37)
MCV RBC: 82 FL (ref 80–100)
MONOCYTES NFR BLD: 7.6 %
MPV: 10.3 FL. (ref 7.2–11.1)
NEUTROPHILS # BLD: 13.5 THOU/UL (ref 1.6–8.1)
NUCLEATED RBCS: 0 /100WBC
PLATELET COUNT*: 69 THOU/UL (ref 150–400)
POTASSIUM SERPL-SCNC: 4.1 MMOL/L (ref 3.5–5.1)
RBC # BLD AUTO: 4.58 MIL/UL (ref 4.5–6)
RDW-CV: 14.2 % (ref 10.5–14.5)
SODIUM SERPL-SCNC: 139 MMOL/L (ref 136–145)
WBC # BLD AUTO: 15.9 THOU/UL (ref 4–11)

## 2021-09-03 NOTE — NUR
PLAN OF CARE:
PHYSICIAN INFORMS THAT THE PT WILL LIKELY REMAIN INPT THROUGH THE WEEKEND. CM
D/C PLANNING NEEDS TBD AT THIS TIME. CM WILL REMAIN AVAILABLE TO ASSIST AND
FOLLOW AS NEEDED.

## 2021-09-03 NOTE — EKG
[place] [address]
Phone:  [phone]                     ELECTROCARDIOGRAM REPORT      
_______________________________________________________________________________
 
Name:         MARIELA LACEY                Room:          [address]    ADM [place] 
.R.#:    [id number]     Account #:     [id number]  
Admission:    21    Attend Phys:   Joyce Nelson, 
Discharge:                Date of Birth: [birth year]  
Date of Service: 21 1639  Report #:      [id number]
        [id number]
_______________________________________________________________________________
THIS REPORT FOR:  //name//                      
 
                          [facility]
                                       
Test Date:    2021               Test Time:    16:39:51
Pat Name:     MARIELA LACEY             Department:   
Patient ID:   [id number]            Room:         [address]
Gender:       M                        Technician:   TONY
:          1965               Requested By: Jannet Potter
Order Number: [id number]    Reading MD:   Alex Lazaro
                                 Measurements
Intervals                              Axis          
Rate:         103                      P:            
MN:                                    QRS:          -15
QRSD:         97                       T:            172
QT:           416                                    
QTc:          545                                    
                           Interpretive Statements
Atrial fibrillation
left ventricular hypertrophy with repolarization
late transition
Prolonged QT interval
 
Compared to ECG 2021 17:45:05
 
Prolonged QT interval now present
Early repolarization no longer present
Possible ischemia no longer present
rate has slowed
Electronically Signed On 9-3-2021 9:22:53 CDT by Alex Lazaro
[email or web address]
 
 
 
 
 
 
 
 
 
 
 
 
 
  <ELECTRONICALLY SIGNED>
                                           By: Alex Lazaro MD, FACC      
  21     0922
D: 21 1639   _____________________________________
T: 21 1639   Alex Lazaro MD, FAC        /EPI

## 2021-09-03 NOTE — [place]KG
[place] [address]
Phone:  [phone]                     ELECTROCARDIOGRAM REPORT      
_______________________________________________________________________________
 
Name:         MARIELA LACEY                Room:          [address]    ADM [place] 
.R.#:    [id number]     Account #:     [id number]  
Admission:    21    Attend Phys:   Joyce Nelson, 
Discharge:                Date of Birth: [birth year]  
Date of Service: 21 1216  Report #:      [id number]
        [id number]
_______________________________________________________________________________
THIS REPORT FOR:  //name//                      
 
                          [facility]
                                       
Test Date:    2021               Test Time:    12:16:43
Pat Name:     MARIELA LACEY             Department:   
Patient ID:   [id number]            Room:         [address]
Gender:       M                        Technician:   TONY
:          1965               Requested By: Joyce Nelson
Order Number: [id number]    Reading MD:   Alex Lazaro
                                 Measurements
Intervals                              Axis          
Rate:         113                      P:            
[place]:                                    QRS:          -19
QRSD:         106                      T:            154
QT:           354                                    
QTc:          486                                    
                           Interpretive Statements
Atrial fibrillation
Borderline left axis deviation
Abnormal T, consider ischemia, lateral leads
Compared to ECG 2021 16:39:51
 
 
Left ventricular hypertrophy no longer present
Prolonged QT interval no longer present
Electronically Signed On 9-3-2021 16:36:41 CDT by Alex Lazaro
[email or web address]
 
 
 
 
 
 
 
 
 
 
 
 
 
 
 
 
  <ELECTRONICALLY SIGNED>
                                           By: Alex Lazaro MD, [facility]      
  21     1636
D: 21 1216   _____________________________________
T: 21 1216   Alex Lazaro MD, [facility]        /EPI

## 2021-09-03 NOTE — NUR
ASSUMED PT CARE AT APPROX 1930. PT IS AWAKE AND ORIENTED X4. PT IS TRACING
AFIB ON THE CARDIAC MONITOR. PT DENIES CHEST PAIN/DISCOMFORT. INTERMITTENT
RUNS OF VTACH NOTED ON TELE, PT IS TACHYCARDIC WITH ACTIVITY (110-130s)
RATE WILL GO DOWN AT REST TO THE 1TEENS, PT DENIES CHEST PAIN/DISCOMFORT. PT
IS CLOSELY MONTORED.

## 2021-09-03 NOTE — CON
[address]  [address]                    CONSULTATION                  
_______________________________________________________________________________
 
Name:       MARIELA LACEY                 Room:           [address] [place]  
M.R.#:  [id number]      Account #:      [id number]  
Admission:  08/30/21     Attend Phys:    Joyce Nelson MD 
Discharge:               Date of Birth:  [birth year]  
         Report #: [id number]
                                                                     [id number]
_______________________________________________________________________________
THIS REPORT FOR:  
 
cc:  Elaine Dong Angela Jo RNP Vasudeva, Amita MD                                                ~
 
 
DATE OF CONSULTATION: 08/31/2021
 
NEPHROLOGY CONSULTATION
 
CONSULTING PHYSICIAN:  Dr. Nelson.
 
REASON FOR CONSULTATION:  Acute kidney injury on chronic kidney disease stage 4.
 
REASON FOR ADMISSION:  Shortness of breath, COVID.
 
HISTORY OF PRESENT ILLNESS:  This is a 56-year-old male with history of chronic 
kidney disease stage 4, due to uncontrolled hypertension, saw me in the office 
in 2018 and has not followed ever since, came in with shortness of breath.  He 
was diagnosed with COVID-19 about 10 days prior.  He has multifocal pneumonia at
this point and his creatinine was also 6.5 on admission.  With IV fluids, his 
creatinine has come down to 6.2.  The patient states he has been urinating a 
little bit more.  He also received dose of Toradol yesterday in the ER.  He also
has elevated troponin.  Cardiology has been consulted on him.  He was also in 
AFib with RVR when he came in.  His creatinine in 2019 was 3.01.  He does not 
take any NSAIDs at home.
 
ALLERGIES:  No known drug allergies.
 
REVIEW OF SYSTEMS:  As mentioned in history of present illness, otherwise 
10-point review of systems are negative.
 
PAST MEDICAL HISTORY:  Includes hypertension, benign prostatic hypertrophy.  He 
has chronic kidney disease stage 4.  His baseline creatinine is around 3.0 back 
in 2019. Does not follow with Nephrology, has not followed with Nephrology since
2018.
 
PAST SURGICAL HISTORY:  None reported.
 
HOME MEDICATIONS:  Include tamsulosin, carvedilol, nifedipine, nebivolol, 
furosemide 40 mg once a day.
 
FAMILY HISTORY:  Reviewed and noncontributory.
 
PHYSICAL EXAMINATION:
 
 
 
[place] [address]                    CONSULTATION                  
_______________________________________________________________________________
 
Name:       MARIELA LACEY                 Room:           [address]#:  [id number]      Account #:      [id number]  
Admission:  08/30/21     Attend Phys:    Joyce Nelson MD 
Discharge:               Date of Birth:  [birth year]  
         Report #: [id number]
                                                                     [id number]
_______________________________________________________________________________
 
 
VITAL SIGNS:  His blood pressure is 136/100, pulse rate was 92, temperature 
36.8, and respiratory rate was 18, and pulse ox was 95% on 2 liters oxygen via 
nasal cannula.
GENERAL:  He is awake, alert, oriented x 3.
HEENT:  Atraumatic and normocephalic.  Conjunctivae normal.  Ears, nose and 
throat normal.  Mucous membranes are moist.
NECK:  There is no JVD.
CHEST:  Bilateral decreased breath sounds.  No crackles.
CARDIOVASCULAR:  S1, S2 normal.  No murmurs.
ABDOMEN:  Soft, nondistended, nontender.
LOWER EXTREMITIES:  There is no edema.
NEUROLOGIC: Function grossly intact.
PSYCHOLOGIC:  Mood and affect seem to be normal.
 
LABORATORY DATA:  His hemoglobin is 13.1.  His sodium is 132.  His platelet 
count is 54. Potassium was 3.6, CO2 was 15, BUN was 130, creatinine was 6.2, 
which is down from 6.5.  Other labs are reviewed.
 
His imaging nuclear scan and chest x-ray were reviewed.
 
ASSESSMENT AND PLAN:
1.  Acute kidney injury on chronic kidney disease, stage 4.  Chronic kidney 
disease is likely due to be uncontrolled hypertension. Has not followed with 
Nephrology since 2018 and his creatinine in 2018 and 2019 was around 3.0.  He 
comes in with a creatinine of 6.5 in the setting of some volume depletion and 
COVID pneumonia.  Need to check a UA and renal imaging.
2.  COVID-19 pneumonia, defer to primary team for treatment.
3.  Paroxysmal atrial fibrillation with rapid ventricular response.  Will defer 
to Cardiology.  They are checking an echocardiogram and they are also starting 
him on Eliquis and they are checking his thyroid studies.
4.  Thrombocytopenia.  We will defer to Internal Medicine for management of 
that.
5.  Anion gap metabolic acidosis in the setting of renal insufficiency.
6.  Elevated troponin.  We will defer to Cardiology.
7.  Hypermagnesemia.
8.  Hypertension.
9.  Hypokalemia.
10.  Mild hyponatremia in the setting of impaired free water excretion.
 
PLAN:
1.  We will change his fluids and we will add some bicarbonate to them.  
Continue IV fluids for now.
2.  Check a UA and renal ultrasound.
3.  CPK checked, it was only around 500.
 
 
 
[place] [address]                    CONSULTATION                  
_______________________________________________________________________________
 
Name:       MARIELA LACEY                 Room:           [address] [place]  
[facility]#:  [id number]      Account #:      [id number]  
Admission:  08/30/21     Attend Phys:    Joyce Nelson MD 
Discharge:               Date of Birth:  [birth year]  
         Report #: [id number]
                                                                     [id number]
_______________________________________________________________________________
 
 
4.  Hold off on Lasix right now.
5.  Check morning labs and avoid nephrotoxic agents.
6.  Avoid magnesium-containing compounds.
 
Thank you for the consultation.  We will continue to follow with you.  Discussed
with the patient and primary team and nurse.
 
 
 
 
 
 
 
 
 
 
 
 
 
 
 
 
 
 
 
 
 
 
 
 
 
 
 
 
 
 
 
 
 
 
 
 
<ELECTRONICALLY SIGNED>
                                        By:  Lesley Candelario MD            
09/03/21     1117
D: 08/31/21 0932_______________________________________
T: 08/31/21 1431Asheryl Candelario MD               /nt

## 2021-09-04 VITALS — DIASTOLIC BLOOD PRESSURE: 88 MMHG | SYSTOLIC BLOOD PRESSURE: 122 MMHG

## 2021-09-04 VITALS — SYSTOLIC BLOOD PRESSURE: 124 MMHG | DIASTOLIC BLOOD PRESSURE: 91 MMHG

## 2021-09-04 VITALS — DIASTOLIC BLOOD PRESSURE: 91 MMHG | SYSTOLIC BLOOD PRESSURE: 146 MMHG

## 2021-09-04 VITALS — SYSTOLIC BLOOD PRESSURE: 123 MMHG | DIASTOLIC BLOOD PRESSURE: 75 MMHG

## 2021-09-04 VITALS — SYSTOLIC BLOOD PRESSURE: 123 MMHG | DIASTOLIC BLOOD PRESSURE: 85 MMHG

## 2021-09-04 LAB
ALBUMIN SERPL-MCNC: 2.2 G/DL (ref 3.4–5)
ALP SERPL-CCNC: 45 U/L (ref 46–116)
ALT SERPL-CCNC: 64 U/L (ref 30–65)
ANION GAP SERPL CALC-SCNC: 11 MMOL/L (ref 7–16)
ANION GAP SERPL CALC-SCNC: 11 MMOL/L (ref 7–16)
AST SERPL-CCNC: 34 U/L (ref 15–37)
BILIRUB SERPL-MCNC: 0.3 MG/DL
BUN SERPL-MCNC: 90 MG/DL (ref 7–18)
BUN SERPL-MCNC: 91 MG/DL (ref 7–18)
CALCIUM SERPL-MCNC: 8 MG/DL (ref 8.5–10.1)
CALCIUM SERPL-MCNC: 8.3 MG/DL (ref 8.5–10.1)
CHLORIDE SERPL-SCNC: 107 MMOL/L (ref 98–107)
CHLORIDE SERPL-SCNC: 107 MMOL/L (ref 98–107)
CO2 SERPL-SCNC: 22 MMOL/L (ref 21–32)
CO2 SERPL-SCNC: 22 MMOL/L (ref 21–32)
CREAT SERPL-MCNC: 5 MG/DL (ref 0.6–1.3)
CREAT SERPL-MCNC: 5 MG/DL (ref 0.6–1.3)
GLUCOSE SERPL-MCNC: 81 MG/DL (ref 70–99)
GLUCOSE SERPL-MCNC: 83 MG/DL (ref 70–99)
HCT VFR BLD CALC: 35.7 % (ref 42–52)
HGB BLD-MCNC: 11.6 GM/DL (ref 14–18)
MAGNESIUM SERPL-MCNC: 1.9 MG/DL (ref 1.8–2.4)
MCH RBC QN AUTO: 27.1 PG (ref 26–34)
MCHC RBC AUTO-ENTMCNC: 32.6 G/DL (ref 28–37)
MCV RBC: 83.1 FL (ref 80–100)
MPV: 10.4 FL. (ref 7.2–11.1)
PLATELET COUNT*: 66 THOU/UL (ref 150–400)
POTASSIUM SERPL-SCNC: 3.9 MMOL/L (ref 3.5–5.1)
POTASSIUM SERPL-SCNC: 3.9 MMOL/L (ref 3.5–5.1)
PROT SERPL-MCNC: 5.4 G/DL (ref 6.4–8.2)
RBC # BLD AUTO: 4.3 MIL/UL (ref 4.5–6)
RDW-CV: 14.2 % (ref 10.5–14.5)
SODIUM SERPL-SCNC: 140 MMOL/L (ref 136–145)
SODIUM SERPL-SCNC: 140 MMOL/L (ref 136–145)
WBC # BLD AUTO: 15.9 THOU/UL (ref 4–11)

## 2021-09-04 NOTE — NUR
PT ALERT ORIENTED. UP AD LARON IN ROOM. CARDIAC MONITOR TRACING AFIB, RATE
CONTROLLED. OCCASIONAL PVC AND ONE EPISOID OF 6 B VTACH.

## 2021-09-04 NOTE — NUR
ASSUMED CARE OF PT AT 0730. PT RESTING IN BED WATCHING TV WAITING FOR
BREAKFAST. A&0X4, COMPLAINS OF SLIGHT HEADACHE BUT STATES HE NEEDS BREAKFAST
AND HIS HEADACHE WILL GO AWAY. TRACING AFIB ON THE CARDIAC MONITOR-RATE IN THE
80'S-90'S. ON RA SAT UPPER 90'S, DENIES ANY SHORTNESS OF BREATH. PT UP AD LARON
IN ROOM. CARDIO AND NEPHRO CONSULTS IN PLACE. PT GOAL FOR TODAY IS CONVERT TO
SINUS RHYTHM AND MAINTAIN HEART RATE BELOW 100. AM ASSESSMENT AS CHARTED.
MEDICATIONS PER MAR. PT REPOSITIONS SELF. HOURLY ROUNDING OBSERVED. BED IN LOW
POSITION. CALL LIGHT WITHIN REACH. WILL CONTINUE PLAN OF CARE.

## 2021-09-05 VITALS — SYSTOLIC BLOOD PRESSURE: 135 MMHG | DIASTOLIC BLOOD PRESSURE: 99 MMHG

## 2021-09-05 VITALS — SYSTOLIC BLOOD PRESSURE: 110 MMHG | DIASTOLIC BLOOD PRESSURE: 80 MMHG

## 2021-09-05 VITALS — DIASTOLIC BLOOD PRESSURE: 75 MMHG | SYSTOLIC BLOOD PRESSURE: 96 MMHG

## 2021-09-05 VITALS — DIASTOLIC BLOOD PRESSURE: 78 MMHG | SYSTOLIC BLOOD PRESSURE: 126 MMHG

## 2021-09-05 VITALS — SYSTOLIC BLOOD PRESSURE: 118 MMHG | DIASTOLIC BLOOD PRESSURE: 66 MMHG

## 2021-09-05 VITALS — DIASTOLIC BLOOD PRESSURE: 85 MMHG | SYSTOLIC BLOOD PRESSURE: 164 MMHG

## 2021-09-05 LAB
ALBUMIN SERPL-MCNC: 2.2 G/DL (ref 3.4–5)
ALP SERPL-CCNC: 44 U/L (ref 46–116)
ALT SERPL-CCNC: 69 U/L (ref 30–65)
ANION GAP SERPL CALC-SCNC: 10 MMOL/L (ref 7–16)
AST SERPL-CCNC: 33 U/L (ref 15–37)
BILIRUB SERPL-MCNC: 0.3 MG/DL
BUN SERPL-MCNC: 78 MG/DL (ref 7–18)
CALCIUM SERPL-MCNC: 8.3 MG/DL (ref 8.5–10.1)
CHLORIDE SERPL-SCNC: 105 MMOL/L (ref 98–107)
CO2 SERPL-SCNC: 25 MMOL/L (ref 21–32)
CREAT SERPL-MCNC: 4.7 MG/DL (ref 0.6–1.3)
GLUCOSE SERPL-MCNC: 88 MG/DL (ref 70–99)
HCT VFR BLD CALC: 35.9 % (ref 42–52)
HGB BLD-MCNC: 11.8 GM/DL (ref 14–18)
MAGNESIUM SERPL-MCNC: 1.8 MG/DL (ref 1.8–2.4)
MCH RBC QN AUTO: 27.2 PG (ref 26–34)
MCHC RBC AUTO-ENTMCNC: 32.9 G/DL (ref 28–37)
MCV RBC: 82.7 FL (ref 80–100)
MPV: 10.2 FL. (ref 7.2–11.1)
PLATELET COUNT*: 85 THOU/UL (ref 150–400)
POTASSIUM SERPL-SCNC: 4.2 MMOL/L (ref 3.5–5.1)
PROT SERPL-MCNC: 6.2 G/DL (ref 6.4–8.2)
RBC # BLD AUTO: 4.34 MIL/UL (ref 4.5–6)
RDW-CV: 14 % (ref 10.5–14.5)
SODIUM SERPL-SCNC: 140 MMOL/L (ref 136–145)
WBC # BLD AUTO: 11.7 THOU/UL (ref 4–11)

## 2021-09-05 NOTE — NUR
NO ACUTE CHANGES THROUGHOUT SHIFT. REFER TO CHARTING. PT REFUSED IV
DOXYCLYCLINE THIS AM-EDUCATION GIVEN AND DR HUNT NOTIFIED. ORDERS RECEIVED
FOR PO ABX. PT DENIES ANY PAIN OR SHORTNESS OF BREATH THROUGHOUT SHIFT. UP TO
RECLINER THIS AFTERNOON-TOLERATED WELL. TRACING AFIB ON THE CARDIAC MONITOR.
ON RA SAT UPPER 90'S. AM ASSESSMENT AS CHARTED. MEDICATIONS PER MAR. PT
REPOSITIONS SELF. HOURLY ROUNDING OBSERVED. BED IN LOW POSITION. CALL LIGHT
WITHIN REACH. WILL CONTINUE PLAN OF CARE.

## 2021-09-05 NOTE — NUR
PT ALERT ORIENTED. UP AD LARON IN ROOM. PT CO PAIN IN IV. IV FLUSHES ALRIGHT.
OFFERED TO CHANGE IV SITE. PT REFUSED. CARDIAC MONITOR TRACING AFIB. RATE
CONTROLLED.

## 2021-09-05 NOTE — NUR
Nutrition: Pt admitted with SOA. Assessed for LOS. H/o afib, CKD IV. Pt is on
heart healthy diet, but would benefit from Renal diet - RD will change diet
order. He is tolerating meals. Labs: , BUN 78, cr 4.7, GFR 13, alb 2,
prealb 40.8. Wts are varied: 190# at admit, today 248# - PLEASE REWEIGH FOR
ACCURACY. +BM. Altered nutrition-related lab values R/T renal FXN and diet
order AEB labs above and no renal restrictions of diet. RD will order Renal
diet. Please reweigh pt. Consider mild risk at this time. RD available via
consult for needs.

## 2021-09-06 VITALS — SYSTOLIC BLOOD PRESSURE: 108 MMHG | DIASTOLIC BLOOD PRESSURE: 71 MMHG

## 2021-09-06 VITALS — SYSTOLIC BLOOD PRESSURE: 140 MMHG | DIASTOLIC BLOOD PRESSURE: 87 MMHG

## 2021-09-06 VITALS — DIASTOLIC BLOOD PRESSURE: 76 MMHG | SYSTOLIC BLOOD PRESSURE: 124 MMHG

## 2021-09-06 LAB
ANION GAP SERPL CALC-SCNC: 13 MMOL/L (ref 7–16)
BUN SERPL-MCNC: 68 MG/DL (ref 7–18)
CALCIUM SERPL-MCNC: 8.2 MG/DL (ref 8.5–10.1)
CHLORIDE SERPL-SCNC: 107 MMOL/L (ref 98–107)
CO2 SERPL-SCNC: 22 MMOL/L (ref 21–32)
CREAT SERPL-MCNC: 4.7 MG/DL (ref 0.6–1.3)
GLUCOSE SERPL-MCNC: 75 MG/DL (ref 70–99)
HCT VFR BLD CALC: 32.1 % (ref 42–52)
HGB BLD-MCNC: 10.8 GM/DL (ref 14–18)
MCH RBC QN AUTO: 27.7 PG (ref 26–34)
MCHC RBC AUTO-ENTMCNC: 33.6 G/DL (ref 28–37)
MCV RBC: 82.6 FL (ref 80–100)
MPV: 9.7 FL. (ref 7.2–11.1)
PLATELET COUNT*: 88 THOU/UL (ref 150–400)
POTASSIUM SERPL-SCNC: 3.9 MMOL/L (ref 3.5–5.1)
RBC # BLD AUTO: 3.89 MIL/UL (ref 4.5–6)
RDW-CV: 14.1 % (ref 10.5–14.5)
SODIUM SERPL-SCNC: 142 MMOL/L (ref 136–145)
WBC # BLD AUTO: 8.9 THOU/UL (ref 4–11)

## 2021-09-06 NOTE — NUR
DISCHARGE ORDERS RECEIVED. DISCHARGE INSTRUCTIONS, CARE NOTES, E SCRIPTS AND
FOLLOW UP APPTS GIVEN TO PT. PT COMMUNICATES UNDERSTANDING OF DISCHARGE
TEACHING. IV AND CARDIAC MONITOR REMOVED. PT DISCHARGED WITH ALL BELONGINGS
AND PAPERWORK VIA WHEELCHAIR WITH NURSING STAFF TO SPOUSE OWN PERSONAL
VEHICLE.

## 2021-09-06 NOTE — NUR
ASSUMED CARE OF PT AFTER REPORT AT 1930. PT A&OX4. VSS. PHYSICAL ASSESSMENT
COMPLETED AND CHARTED. PT ON RA. PT TRACING SR ON TELE. PT UPADLIB TO
RESTROOM. PT DENIES ANY PAIN. CALL LIGHT WITHIN REACH.

## 2021-09-06 NOTE — NUR
ASSUMED CARE OF PT AT 0730. PT A&0X4, DENIES ANY PAIN OR SHORTNESS OF BREATH.
TRACING SR ON THE CARDIAC MONITOR. ON RA SAT UPPER 90'S. PT UP AD LARON IN ROOM,
PT GOAL FOR TODAY IS DISCHARGE HOME. AM ASSESSMENT AS CHARTED. MEDICATIONS PER
MAR. PT REPOSITIONS SELF. HOURLY ROUNDING OBSERVED. BED IN LOW POSITION. CALL
LIGHT WITHIN REACH. WILL CONTINUE PLAN OF CARE.